# Patient Record
Sex: MALE | URBAN - METROPOLITAN AREA
[De-identification: names, ages, dates, MRNs, and addresses within clinical notes are randomized per-mention and may not be internally consistent; named-entity substitution may affect disease eponyms.]

---

## 2024-04-17 ENCOUNTER — HOME HEALTH ADMISSION (OUTPATIENT)
Dept: HOME HEALTH SERVICES | Facility: HOME HEALTH | Age: 79
End: 2024-04-17
Payer: MEDICARE

## 2024-04-18 ENCOUNTER — HOME CARE VISIT (OUTPATIENT)
Dept: SCHEDULING | Facility: HOME HEALTH | Age: 79
End: 2024-04-18

## 2024-04-18 VITALS
OXYGEN SATURATION: 99 % | SYSTOLIC BLOOD PRESSURE: 110 MMHG | RESPIRATION RATE: 97 BRPM | TEMPERATURE: 97.1 F | HEART RATE: 86 BPM | DIASTOLIC BLOOD PRESSURE: 60 MMHG

## 2024-04-18 PROCEDURE — G0299 HHS/HOSPICE OF RN EA 15 MIN: HCPCS

## 2024-04-18 PROCEDURE — 0221000100 HH NO PAY CLAIM PROCEDURE

## 2024-04-18 ASSESSMENT — ENCOUNTER SYMPTOMS
DYSPNEA ACTIVITY LEVEL: AFTER AMBULATING 10 - 20 FT
PAIN LOCATION - PAIN QUALITY: ACHY

## 2024-04-18 NOTE — HOME HEALTH
Patient is a 79 y/o/ with past medical hx of htn, and afib seen today for rn soc after recent dx of post laminectomy syndrome of l4-s1, presents in daughters home due to weakness as his wife cannot care for hime at their home, a+o x3, vs wnl, reports pain 8/10 to lower back, lung sounds clear, denies any chest pain but endorses sob on exertion, pt utilizing walker for ambulation at this time as the pain and neuropathy in bilateral feet make it difficult, pt reports decreased appeite after being in rehab, loss of 6 lbs, denies issues with elimination but endorses occastional constpation, educated on miralax due to opiate use, already utilizing stool softener, thought process orgnized and goal directed, insight/judgement fair, pt agreeable to services, consents signed, all questions and concerns addressed, pt/ot added for decreased mobility/endurance, will continue to monitor 1wk3   Caregiver involvement: via live in family  Medications reconciled and all medications are available in home  Home health supplies by type and quantity ordered/delivered this visit include: na  Patient education provided this visit: SN educated patient and patient's caregiver on admission process, call us first procedure, s/s of infection, fall precautions  Patient and patient caregiver verbalizes understanding via the teach back method.   Progress toward goals: progressing well  Home exercise program: continue as ordered   Plan for next visit: disease/medication management   The following discharge planning was discussed with the patient/ patient's caregiver: DC when goals met

## 2024-04-19 ENCOUNTER — HOME CARE VISIT (OUTPATIENT)
Dept: SCHEDULING | Facility: HOME HEALTH | Age: 79
End: 2024-04-19

## 2024-04-19 VITALS
TEMPERATURE: 97.1 F | HEART RATE: 78 BPM | RESPIRATION RATE: 18 BRPM | SYSTOLIC BLOOD PRESSURE: 100 MMHG | OXYGEN SATURATION: 97 % | DIASTOLIC BLOOD PRESSURE: 65 MMHG

## 2024-04-19 PROCEDURE — G0152 HHCP-SERV OF OT,EA 15 MIN: HCPCS

## 2024-04-19 ASSESSMENT — ENCOUNTER SYMPTOMS: PAIN LOCATION - PAIN QUALITY: ACHE

## 2024-04-19 NOTE — HOME HEALTH
This patient has answered NO to the following questions:   1) have you traveled outside the country   2) have you been exposed to or been in contact with anyone whom traveled outside the country in the past two weeks   3) do you have a sore throat/fever/dry cough      4)The patient has been educated on and demonstrates good understanding via the teach-back method for the following: Signs and symptoms of COVID-19 yes    Patient is a 79 y/o/ with past medical hx of htn, dx of post laminectomy syndrome of l4-s1, Pt had laminectomy dec 2023,.  followed with rehab and returned kassie days ago to his daughter's home as his wife cannot care for him at his own home but he intends to move back home after healing.   Pt had previously had laminectomy 2022  as well with good results.  Pt has lump on surgery site on back causing pain last night, daughter to call surgeon's office today. Pt has been putting heat on back with relief, wear back brace at all times when ambulating. Pt was able to repeat back precautions of no bending, lifting, twisting , requires assist for ADLs  due to pain, unsteadiness, decreased endurance.   Pt able to ambulate with walker with min assist for safety, transfers min assist with mild pain reported in back, reports LE feeling very heavy with numbness/tingling bilat LE, right worse than left . Pt demo's decresaed standing tolerance/balance requiring frequent rest periods with ADL's and IADL's and UE support at all times, limited standing due to tingling bilat LE.  Pt demo's bilat UE strength 3+/5 for transfers.    Pt would benefit with OT to increase safety and indep with ADL's, IADL's, increase bilat UE strength for functional transfers, adaptive equipment recommendations and training, increasing standing tolerance/balance for safety with ADL's with fewer rest periods.   Pt's goal is to return home with wife with decreased pain and increased strength, not use walker.   Discussed DC plan  with pt and

## 2024-04-24 ENCOUNTER — HOME CARE VISIT (OUTPATIENT)
Dept: SCHEDULING | Facility: HOME HEALTH | Age: 79
End: 2024-04-24

## 2024-04-24 VITALS
HEART RATE: 78 BPM | RESPIRATION RATE: 18 BRPM | TEMPERATURE: 97.8 F | SYSTOLIC BLOOD PRESSURE: 119 MMHG | DIASTOLIC BLOOD PRESSURE: 65 MMHG | OXYGEN SATURATION: 97 %

## 2024-04-24 VITALS — HEART RATE: 90 BPM | TEMPERATURE: 98 F | OXYGEN SATURATION: 99 %

## 2024-04-24 VITALS
OXYGEN SATURATION: 98 % | DIASTOLIC BLOOD PRESSURE: 84 MMHG | RESPIRATION RATE: 18 BRPM | TEMPERATURE: 97.1 F | HEART RATE: 90 BPM | SYSTOLIC BLOOD PRESSURE: 142 MMHG

## 2024-04-24 PROCEDURE — G0299 HHS/HOSPICE OF RN EA 15 MIN: HCPCS

## 2024-04-24 PROCEDURE — G0151 HHCP-SERV OF PT,EA 15 MIN: HCPCS

## 2024-04-24 PROCEDURE — G0158 HHC OT ASSISTANT EA 15: HCPCS

## 2024-04-24 ASSESSMENT — ENCOUNTER SYMPTOMS
PAIN LOCATION - PAIN QUALITY: ACHY
PAIN LOCATION - PAIN QUALITY: SORE
DYSPNEA ACTIVITY LEVEL: AFTER AMBULATING MORE THAN 20 FT
PAIN LOCATION - PAIN QUALITY: ACHE

## 2024-04-24 NOTE — HOME HEALTH
Patient seen today for rn follow up visit, vs wnl, pain 5/10 to back, lung sounds clear, denies sob or angina, no complaints at this time, all questions and concerns addressed,   Caregiver involvement: via family  Medications reconciled and all medications are available in home  Home health supplies by type and quantity ordered/delivered this visit include: na  Patient education provided this visit: SN educated patient and patient's caregiver on nonpharm pain relieving techniques.  Patient and patient caregiver verbalizes understanding via the teach back method.   Progress toward goals: progressing well  Home exercise program: continue as ordered   Plan for next visit: discipline discharge  The following discharge planning was discussed with the patient/ patient's caregiver: DC when goals met.

## 2024-04-24 NOTE — HOME HEALTH
Client had excellent therapy session as he worked on seated knee extensions, ankle pumps, hip flexion, marching in place, sidestpping and hip extension with VC to stay in pain free AROM throughout as he did besides with hip flexion as he started to feel some LBP strain towards end range of femoral flexion. Client ambulated over 200 feet with his 2WW with VC to increase his heel strike, toe clearance and hip extension in order to get back to maximal level of function. Patient has handout of HEP and verbalized some questions regarding plan that was worked on today with supine exercises.

## 2024-04-24 NOTE — HOME HEALTH
/72  sitting   Pt seated in recliner upone DAO arrival . Pt agreeable to Tx. No reported med changes or incidents. Pt reporting he is pleased with his progress in just the past two days. and demonstrated LE exercises and AROM to cross his legs to angie shoes and socks indep . Pt had difficulty donning back brace indep . Advised to doff in sitting in chair and leave stings used to tighten tention loosly attached to front velcro and doff brace in sitting . Prior to standing , he should angie the brace snuggly around waist and then tighten around waist in standing . Pt demonstrated GOod understanding of that method , but required min A to get it on properly . Pt stated he does not like the transfer bench over tub because water goes out on the floor, so he is using the walk in shower in master bedroom , and walk in shower at home with regular shower seat. Provided with HEP for UE and core strengthening with yellow resistance band exercises in unsupported sitting . Pt tolerated well in reps of 10 - 10 + in multiple directions with no c/o pain . Pt will be seen by OTR for remaining txs and dc wehn goals met.

## 2024-04-30 ENCOUNTER — HOME CARE VISIT (OUTPATIENT)
Dept: SCHEDULING | Facility: HOME HEALTH | Age: 79
End: 2024-04-30
Payer: MEDICARE

## 2024-04-30 VITALS
TEMPERATURE: 97.1 F | DIASTOLIC BLOOD PRESSURE: 70 MMHG | OXYGEN SATURATION: 98 % | HEART RATE: 63 BPM | RESPIRATION RATE: 18 BRPM | SYSTOLIC BLOOD PRESSURE: 116 MMHG

## 2024-04-30 VITALS
RESPIRATION RATE: 18 BRPM | TEMPERATURE: 97.8 F | DIASTOLIC BLOOD PRESSURE: 67 MMHG | HEART RATE: 80 BPM | SYSTOLIC BLOOD PRESSURE: 133 MMHG | OXYGEN SATURATION: 97 %

## 2024-04-30 PROCEDURE — G0152 HHCP-SERV OF OT,EA 15 MIN: HCPCS

## 2024-04-30 PROCEDURE — G0151 HHCP-SERV OF PT,EA 15 MIN: HCPCS

## 2024-04-30 ASSESSMENT — ENCOUNTER SYMPTOMS: PAIN LOCATION - PAIN QUALITY: ACHE

## 2024-04-30 NOTE — HOME HEALTH
This patient has answered NO to the following questions:   1) have you traveled outside the country   2) have you been exposed to or been in contact with anyone whom traveled outside the country in the past two weeks   3) do you have a sore throat/fever/dry cough      4)The patient has been educated on and demonstrates good understanding via the teach-back method for the following: Signs and symptoms of COVID-19 yes    No reported changes or incidents since previous visit  Pt and daughter instructed in safe shower transfer, mod VC after teach back method for proper technique and hand placement , pt's daughter home does not have grab barsf. Instructed in safe mobility in home with walker, requires assist  to pass through to bathroom in small area. OT raised chair to increase safety.   Instructed in safe dressing/bathing, recommended sitting for same as much as possible, recommended stable dressing chair vs standing as currently doing or sitting on EOB , use of reacher for LB dressing.   Instructed in bilat UE strengthneing in supported and unsupported sitting with no increased pain, posture exercises.  Pt instructed in don/doffing back  brace in sitting and VC for fasteners and making tight enough.     Discussed DC plan  with pt and caregiver, plan to DC when goals met  Pt progressing well towards OT goals  Plan to continue increasing UE strength, ADL's, safety with transfers.

## 2024-05-01 ENCOUNTER — HOME CARE VISIT (OUTPATIENT)
Dept: SCHEDULING | Facility: HOME HEALTH | Age: 79
End: 2024-05-01
Payer: MEDICARE

## 2024-05-01 VITALS
SYSTOLIC BLOOD PRESSURE: 106 MMHG | TEMPERATURE: 97.1 F | RESPIRATION RATE: 18 BRPM | HEART RATE: 58 BPM | OXYGEN SATURATION: 98 % | DIASTOLIC BLOOD PRESSURE: 62 MMHG

## 2024-05-01 PROCEDURE — G0299 HHS/HOSPICE OF RN EA 15 MIN: HCPCS

## 2024-05-01 NOTE — HOME HEALTH
Patient DC from [facility] services with all goals met. SN provided education on fall precaution, s/s of infection, anxiety, depression, post op complications, HTN, and medication management. Patient verbalized understanding of education provided and when to call MD. PT/OT provided therapy, patient tolerated well. Patient with healed wound to lower back

## 2024-05-02 ENCOUNTER — HOME CARE VISIT (OUTPATIENT)
Dept: SCHEDULING | Facility: HOME HEALTH | Age: 79
End: 2024-05-02
Payer: MEDICARE

## 2024-05-02 VITALS
OXYGEN SATURATION: 97 % | TEMPERATURE: 97.8 F | SYSTOLIC BLOOD PRESSURE: 112 MMHG | HEART RATE: 80 BPM | RESPIRATION RATE: 18 BRPM | DIASTOLIC BLOOD PRESSURE: 76 MMHG

## 2024-05-02 PROCEDURE — G0151 HHCP-SERV OF PT,EA 15 MIN: HCPCS

## 2024-05-02 ASSESSMENT — ENCOUNTER SYMPTOMS: PAIN LOCATION - PAIN QUALITY: SORE

## 2024-05-02 NOTE — HOME HEALTH
Client had excellent therapy session as he worked on open and closed chain strengthening exercises and worked on improving his hip extension along with his core strengthening with side stepping, RDL's, sinlge leg stance RDL's, along with posterior chain strengthening with posterior VC to increase AROM. Patient ambulated x 250 feet with his 2WW and VC to increase his step length and his toe off along with improving his hip flexion to improve his toe clearance with his functional mobility secondary to decreased L hip flexion and toe clearance strength. Patient really enjoyed his PT sessoin as he was fatigued and felt better after PT as caregiver stated best session client has had in PT.

## 2024-05-06 ENCOUNTER — HOME CARE VISIT (OUTPATIENT)
Dept: SCHEDULING | Facility: HOME HEALTH | Age: 79
End: 2024-05-06
Payer: MEDICARE

## 2024-05-06 PROCEDURE — G0157 HHC PT ASSISTANT EA 15: HCPCS

## 2024-05-07 VITALS
OXYGEN SATURATION: 97 % | SYSTOLIC BLOOD PRESSURE: 119 MMHG | TEMPERATURE: 97.8 F | HEART RATE: 72 BPM | DIASTOLIC BLOOD PRESSURE: 65 MMHG | RESPIRATION RATE: 18 BRPM

## 2024-05-07 ASSESSMENT — ENCOUNTER SYMPTOMS: PAIN LOCATION - PAIN QUALITY: SORE

## 2024-05-07 NOTE — HOME HEALTH
Client had excellent therapy session as he was able to do more sit to stands from a low rise surface along with ambulating over 250 feet with his 2WW as he still has gastroc weakness with his biggest deficit is his plantarflexion strength that warrants home health PT. Patient continues to work on all standing closed chain exercises until fatigued and is motivated throughout to get back to IADL's as he wants to leave his daughters home and get back home. Client will progress to walking outside with his AAD along with continue to increase to 3 sets x 15 reps with all squats, gastroc raises, stiff legged deadlifts and sidestepping exercises.

## 2024-05-08 ENCOUNTER — HOME CARE VISIT (OUTPATIENT)
Dept: SCHEDULING | Facility: HOME HEALTH | Age: 79
End: 2024-05-08
Payer: MEDICARE

## 2024-05-08 VITALS
DIASTOLIC BLOOD PRESSURE: 64 MMHG | TEMPERATURE: 97.3 F | HEART RATE: 60 BPM | SYSTOLIC BLOOD PRESSURE: 96 MMHG | RESPIRATION RATE: 18 BRPM

## 2024-05-08 PROCEDURE — G0157 HHC PT ASSISTANT EA 15: HCPCS

## 2024-05-08 NOTE — HOME HEALTH
Primary focus of care and skilled reason for visit: Improving functionl mobility following Laminectomy  Subjective: Pt states he has really progressed because before both his legs were numb but they are much better now. The swelling in his legs has gone down tremendously. He has difficulty walking which he feels is the biggest problem and he gets tired easily. He is afraid to let go of the walker because he is afraid to fall.  States his goal is to go back to the cane. He doesnt have pain but if he sits for too long and get up then he gets pain in the back where the incision is.   Caregiver is available: No caregiver available this session.  Medications reconciled and all medications available in home and managed by pt daughter.     GAIT: Pt amb with FWW and SBA; 100ft with focus on improving endurance and safety. Pt presents with decrease heel strike, decrease toe off, and decrease hip extension with forward head posture. VC for correction to deviations and to keep forward gaze. Pt reporting L hamstring and glute tightness when heel striking with LLE which is uncomfortable for him.   STRETCHING: Post gait training instructed in proper stretches to improve muscle flexibilty to decrease discomfort with amb. With pt reclined in chair utilized belt and instructed in self stretching; 30sec x3 and increasing in range as muscle relaxed. Pt reporting stretching and tight pain but relieved in each position. Instructed in bringing single knee to chest and holding for a stretch to target glutes. Instructed pt then to stand up and take a few steps. Pt reports slight relief.   BALANCE: NBOS unsupported; pt immediately presenting with Mod Lateral swaying. VC for activation of core and forwrd gaze with which pt then presented with Max swaying. Approximation applied at hips x30sec and then trialed again. pt was able to progress to min swaying 30sec hold. Instructed in cervical rotation and extension x 3 reps with pt unsteady with

## 2024-05-10 ENCOUNTER — HOME CARE VISIT (OUTPATIENT)
Dept: SCHEDULING | Facility: HOME HEALTH | Age: 79
End: 2024-05-10
Payer: MEDICARE

## 2024-05-10 VITALS
SYSTOLIC BLOOD PRESSURE: 100 MMHG | DIASTOLIC BLOOD PRESSURE: 69 MMHG | TEMPERATURE: 97.3 F | HEART RATE: 56 BPM | RESPIRATION RATE: 18 BRPM | OXYGEN SATURATION: 98 %

## 2024-05-10 PROCEDURE — G0152 HHCP-SERV OF OT,EA 15 MIN: HCPCS

## 2024-05-10 NOTE — HOME HEALTH
This patient has answered NO to the following questions:   1) have you traveled outside the country   2) have you been exposed to or been in contact with anyone whom traveled outside the country in the past two weeks   3) do you have a sore throat/fever/dry cough      4)The patient has been educated on and demonstrates good understanding via the teach-back method for the following: Signs and symptoms of COVID-19 yes    No reported changes or incidents since previous visit  Pt has follow up appointment with surgeon 5/16/24 and will have xray prior to appointment  Instructed pt in light kitchen tasks with walker, with min VC after teach back method for proper body mechanics with back precautions of no bending, twisting and keeping close to items. Pt able to retrieve items for meal prep with SBA and VC 's for safet. Instructee in transferring items to recliner, recommending walker tray.  Pt 's daughter present for session and to follow up with walker tray. Recommended using plastic plate and travel cup for transferring coffee, placing in walker pouch. Pt receptive to same and able to perform with SBA for safety and VC to avoid spillage and dropping items. Instructed in standing tolerance/balance with UE activity, able to stand for 10 mins prior to rest .  Plan to continue increasing UE strength, ADL's, safety with transfers.  Discussed DC plan  with pt and caregiver, plan to DC when goals met  Pt progressing well towards OT goals

## 2024-05-13 ENCOUNTER — HOME CARE VISIT (OUTPATIENT)
Dept: SCHEDULING | Facility: HOME HEALTH | Age: 79
End: 2024-05-13
Payer: MEDICARE

## 2024-05-13 VITALS
HEART RATE: 73 BPM | RESPIRATION RATE: 16 BRPM | DIASTOLIC BLOOD PRESSURE: 82 MMHG | SYSTOLIC BLOOD PRESSURE: 98 MMHG | OXYGEN SATURATION: 100 % | TEMPERATURE: 97.5 F

## 2024-05-13 VITALS
RESPIRATION RATE: 18 BRPM | TEMPERATURE: 97.6 F | SYSTOLIC BLOOD PRESSURE: 90 MMHG | OXYGEN SATURATION: 95 % | DIASTOLIC BLOOD PRESSURE: 62 MMHG

## 2024-05-13 PROCEDURE — G0157 HHC PT ASSISTANT EA 15: HCPCS

## 2024-05-13 PROCEDURE — G0152 HHCP-SERV OF OT,EA 15 MIN: HCPCS

## 2024-05-13 ASSESSMENT — ENCOUNTER SYMPTOMS: PAIN LOCATION - PAIN QUALITY: ACHE

## 2024-05-13 NOTE — HOME HEALTH
This patient has answered NO to the following questions:   1) have you traveled outside the country   2) have you been exposed to or been in contact with anyone whom traveled outside the country in the past two weeks   3) do you have a sore throat/fever/dry cough      4)The patient has been educated on and demonstrates good understanding via the teach-back method for the following: Signs and symptoms of COVID-19 yes    Pt had xray on saturday and follows up with surgeon on 5/17/24. Pt reports he is a little more sore today but was out yesterday as well.   Pt remains at daughter's home for now, SBA for bathing with wlak in showr, feels he will have no problem at his own home where he has grab bars and showre chair.  Pt is unsure of when he is returning home.   Pt has raised toilet seat over toilet at home as well with arms.   Pt able to dress indep, is indep with UE HEP, supervision for ADLs otehr than showr with SBA for safty.   Pt seen for OT DC today. Pt insructed in bilat UE strenghening, posture exercises, core strengthening and trunk control to assist with ADL's, transfers, back precautions. . Instructed pt in energy conservation , body mechanics, work simplification with ADL's to reduce fatigue and risk for falls, adaptive equipment recommendations and training  Pt indep with HEP and met all goals, receptive to OT DC.

## 2024-05-14 NOTE — HOME HEALTH
Primary focus of care and skilled reason for visit: Improving functional mobility following laminectomy  Subjective: States the swelling in his legs are less. His RLE is feeling heavy so it was harder to bring it into bed. He still feels tight stretching pain to hamstrings when he walks with heel to toe. States he can't sit for long periods of time because it hurts when he gets up. Reports he wnts to be able to walk like he use to with the cane or with nothing because he wants to be able to go back home.  Medications reconciled and all medications are available in the home this visit and daughter manages it.     GAIT: Pt amb with FWW; SBA; 300ft within home; Amb with SPC Min A 100ft trialling cane first on LUE due to pt reports of RLE pain however pt did not feel comfortable so cane was switched to RUE as pt is normally used to the cane on R side. Pt presents with unsteadiness, stating he use to be able to walk fast but educated pt on safety, to not use the cane without assistance until cleared and ensuring slow pacing with training to improve safety. Pt verbalized understanding through teach back.  THER EX: Supine exercises as pt reporting occasional back pain after amb; instructed in flexing knees up when in supine to neutralize spine to decrease back pain. 10reps x 2 heel slides and SLR. Pt reporting RLE feeling heavy and getting tired faster.  BALANCE:  NBOS unsupported 30sec with pt able to show progress from previous session without swaying or losing balance. Instructed in cervical rotations x 3 reps and then cervical extension and flexion x 3 reps with pt losing balance initially. instructing pt to hold cervical extension for 10sec and completed again ROM with improved stability. Perturbations in various directions laterally, anteriorly, posteriorly. Able to show good stability with lateral forces however losing balance and unable to hold with light anterior posterior forces. With therapist hands anterior to

## 2024-05-20 ENCOUNTER — HOME CARE VISIT (OUTPATIENT)
Dept: SCHEDULING | Facility: HOME HEALTH | Age: 79
End: 2024-05-20
Payer: MEDICARE

## 2024-05-20 VITALS
HEART RATE: 80 BPM | DIASTOLIC BLOOD PRESSURE: 68 MMHG | RESPIRATION RATE: 16 BRPM | OXYGEN SATURATION: 95 % | TEMPERATURE: 97.5 F | SYSTOLIC BLOOD PRESSURE: 120 MMHG

## 2024-05-20 PROCEDURE — G0157 HHC PT ASSISTANT EA 15: HCPCS

## 2024-05-20 ASSESSMENT — ENCOUNTER SYMPTOMS: PAIN LOCATION - PAIN QUALITY: ACHE

## 2024-05-21 NOTE — HOME HEALTH
squats 10reps with reaching BUE to ankles to improve pt stabiltiy with pulling up his pants. VC for deeper squat by shifting weight slightly posteriorly and allowing more hinge at hip. VC for keeping knees behind toes for proper form.     Assessment of treatment/improvements or declines from previous treatments: Pt has shown improved balance stability and able to participate in more challenging balance exercises without losing balance. needs safety edu regarding amb too quickly with canes. discussed he can start to amb with cane with SBA only and not on his own due to pt still noted some instability. Pt verbalized understanding through teach back.     Specific plan for next treatment: Pt to see PT next visit.     Discharge planning discussed with patient and caregiver. Discharge planning as follows: next visit  Patient/caregiver verbalize agreement with discharge planning.

## 2024-05-29 ENCOUNTER — HOME CARE VISIT (OUTPATIENT)
Dept: SCHEDULING | Facility: HOME HEALTH | Age: 79
End: 2024-05-29
Payer: MEDICARE

## 2024-05-29 VITALS
RESPIRATION RATE: 18 BRPM | HEART RATE: 64 BPM | DIASTOLIC BLOOD PRESSURE: 76 MMHG | TEMPERATURE: 97.3 F | SYSTOLIC BLOOD PRESSURE: 118 MMHG | OXYGEN SATURATION: 94 %

## 2024-05-29 PROCEDURE — G0157 HHC PT ASSISTANT EA 15: HCPCS

## 2024-05-29 NOTE — HOME HEALTH
Primary focus of care and skilled reason for visit: Improving functional mobility following laminectomy  Subjective: Pt reports that cardiologist said pts mitral valve prolapse has gotten worse and MD wants to do more tests and cardiocath. States both bottom of his feet feel numb so he can't feel it much but states the numbness is getting better. He is currently walking around home with 2 canes and not so much FWW anymore.  Caregiver is available: pt brother  Caregiver present at this visit and did not participate with clinician.  Meds are managed by pt daughter and no changes.    GAIT: Amb level surface 50ft with B cane in each hand and then 30ft with 1 cane. 100ft unlevel surface with B canes and focus on improving pt safety with community amb. States this is the first time he has amb outside. Navigating up and down drive way, slops, and steps getting in and out of daughters home.  Pt occasionally unsteady on feet.   THER EX: 1min 30sec on restorator bike with focus on improving BLE muscle endurance for carryover to amb. Pt able to consistently peddle without rest periods. Standing therex 20reps heel raises, hip extension, and squats with focus on improving BLE muscle strength. VC for proper form esepecially with squats.     Assessment of treatment/improvements or declines from previous treatments: Pt has made good progress toward goals and continues to require AD for amb due to unsteadiness. Pt has howeveri improved in balance stability, endurance, and strength.     Specific plan for next treatment: Pt to see PT next visit.    Discharge planning discussed with patient. Discharge planning as follows: When goals met.  Patient/caregiver verbalize agreement with discharge planning.

## 2024-06-05 ENCOUNTER — HOME CARE VISIT (OUTPATIENT)
Dept: SCHEDULING | Facility: HOME HEALTH | Age: 79
End: 2024-06-05
Payer: MEDICARE

## 2024-06-05 VITALS
SYSTOLIC BLOOD PRESSURE: 101 MMHG | DIASTOLIC BLOOD PRESSURE: 67 MMHG | OXYGEN SATURATION: 98 % | TEMPERATURE: 97.8 F | RESPIRATION RATE: 18 BRPM | HEART RATE: 87 BPM

## 2024-06-05 PROCEDURE — G0151 HHCP-SERV OF PT,EA 15 MIN: HCPCS

## 2024-06-05 ASSESSMENT — ENCOUNTER SYMPTOMS
PAIN LOCATION - PAIN QUALITY: ACHY
STOOL DESCRIPTION: FORMED

## 2024-06-05 NOTE — HOME HEALTH
Patient instructed to continue HEP at least 2 times daily for continued progression and benefits. Patient instructed to call 911 in case of medical emergency and to call MD for any changes to medical status. Therapist instructed patient to continue taking medications as prescribed by MD and obtain new prescription/refills from pharmacy as appropriate. Patient educated on importance of keeping all MD/lab/therapy appointments. Patient instructed to keep list of emergency phone numbers by the phone or in their mobile phone and to wash hands frequently to prevent spread of infection. Patient verbalizes understanding.    Patient DC from skilled [facility] services with all goals met. Patient participated in therapy visits where patient demonstrated improvement with strength, balance, ROM, pain control, fall prevention strategies, and functional mobility including bed mobility, transfers, gait, and step navigation. Patient/CG agree to DC. Patient has current/updated medication list present in home. Referring Md notified of d/c.    Patient reports that right leg is stronger than left. He is using a roller walker independently most of the time for walking around the house but has started to use two canes. He continues to need supervision to walk with canes outdoors and to go up and down step at front door. He reports that he has decreased numbness in both feet. Entire feet were numb post surgery but now only soles of feet are. numb. He reports that he is able to take a shower by himself and dress yourself. He is practicing ambulation with two canes. Hx laminectomy then fusion  Pain: 0/10 in sitting, 3/10 when walking which goes away when he changes position.   Strength: Right leg grossly 4/5 in major muscle groups 3/.5 left hip flexors, add, abductors,. knee extensor and knee flexors. PF 4/5 df 3+/5